# Patient Record
Sex: MALE | Race: OTHER | HISPANIC OR LATINO | URBAN - METROPOLITAN AREA
[De-identification: names, ages, dates, MRNs, and addresses within clinical notes are randomized per-mention and may not be internally consistent; named-entity substitution may affect disease eponyms.]

---

## 2022-03-12 ENCOUNTER — EMERGENCY (EMERGENCY)
Facility: HOSPITAL | Age: 37
LOS: 1 days | Discharge: HOME | End: 2022-03-12
Attending: EMERGENCY MEDICINE
Payer: COMMERCIAL

## 2022-03-12 VITALS
TEMPERATURE: 98 F | SYSTOLIC BLOOD PRESSURE: 129 MMHG | OXYGEN SATURATION: 96 % | RESPIRATION RATE: 16 BRPM | DIASTOLIC BLOOD PRESSURE: 82 MMHG | HEART RATE: 78 BPM | WEIGHT: 202.83 LBS

## 2022-03-12 PROCEDURE — 70450 CT HEAD/BRAIN W/O DYE: CPT | Mod: 26,MG

## 2022-03-12 PROCEDURE — 70450 CT HEAD/BRAIN W/O DYE: CPT | Mod: MG

## 2022-03-12 PROCEDURE — 72100 X-RAY EXAM L-S SPINE 2/3 VWS: CPT

## 2022-03-12 PROCEDURE — 99285 EMERGENCY DEPT VISIT HI MDM: CPT

## 2022-03-12 PROCEDURE — 99284 EMERGENCY DEPT VISIT MOD MDM: CPT | Mod: 25

## 2022-03-12 PROCEDURE — G1004: CPT

## 2022-03-12 PROCEDURE — 72125 CT NECK SPINE W/O DYE: CPT | Mod: 26,MG

## 2022-03-12 PROCEDURE — 72100 X-RAY EXAM L-S SPINE 2/3 VWS: CPT | Mod: 26

## 2022-03-12 PROCEDURE — 72125 CT NECK SPINE W/O DYE: CPT | Mod: MG

## 2022-03-12 PROCEDURE — 71046 X-RAY EXAM CHEST 2 VIEWS: CPT | Mod: 26

## 2022-03-12 PROCEDURE — 71046 X-RAY EXAM CHEST 2 VIEWS: CPT
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
## 2022-03-12 NOTE — ED PROVIDER NOTE - MUSCULOSKELETAL, MLM
Paracervical neck tenderness. No midline tenderness. Spine appears normal, range of motion is not limited, no muscle or joint tenderness

## 2022-03-12 NOTE — ED PROVIDER NOTE - CLINICAL SUMMARY MEDICAL DECISION MAKING FREE TEXT BOX
Patient with headache, neck pain, back pain, and chest pain. Will get CT, x-ray, and reassess. Patient declined analgesia.

## 2022-03-12 NOTE — ED PROVIDER NOTE - PATIENT PORTAL LINK FT
You can access the FollowMyHealth Patient Portal offered by [facility] by registering at the following website: [email or web address]. By joining [facility]’s FollowMyHealth portal, you will also be able to view your health information using other applications (apps) compatible with our system.

## 2022-03-13 VITALS
TEMPERATURE: 98 F | RESPIRATION RATE: 18 BRPM | DIASTOLIC BLOOD PRESSURE: 74 MMHG | SYSTOLIC BLOOD PRESSURE: 121 MMHG | OXYGEN SATURATION: 98 % | HEART RATE: 74 BPM

## 2024-08-09 NOTE — ED ADULT NURSE NOTE - CHPI ED NUR SYMPTOMS POS
Elevate your legs to help with swelling. Start wearing compression socks while at work to help with the leg swelling. Follow-up with PCP.   
BACK PAIN

## 2025-04-25 ENCOUNTER — EMERGENCY (EMERGENCY)
Facility: HOSPITAL | Age: 40
LOS: 1 days | End: 2025-04-25
Attending: EMERGENCY MEDICINE
Payer: SELF-PAY

## 2025-04-25 VITALS
HEART RATE: 81 BPM | SYSTOLIC BLOOD PRESSURE: 121 MMHG | HEIGHT: 67 IN | WEIGHT: 205.03 LBS | TEMPERATURE: 98 F | RESPIRATION RATE: 18 BRPM | OXYGEN SATURATION: 97 % | DIASTOLIC BLOOD PRESSURE: 77 MMHG

## 2025-04-25 PROBLEM — Z78.9 OTHER SPECIFIED HEALTH STATUS: Chronic | Status: ACTIVE | Noted: 2022-03-12

## 2025-04-25 PROCEDURE — 87081 CULTURE SCREEN ONLY: CPT

## 2025-04-25 PROCEDURE — 99284 EMERGENCY DEPT VISIT MOD MDM: CPT

## 2025-04-25 PROCEDURE — 99283 EMERGENCY DEPT VISIT LOW MDM: CPT

## 2025-04-25 RX ORDER — SULFACETAMIDE SODIUM 10 %
1 DROPS OPHTHALMIC (EYE)
Qty: 10 | Refills: 0
Start: 2025-04-25 | End: 2025-04-29

## 2025-04-25 RX ORDER — DEXAMETHASONE 0.5 MG/1
10 TABLET ORAL ONCE
Refills: 0 | Status: COMPLETED | OUTPATIENT
Start: 2025-04-25 | End: 2025-04-25

## 2025-04-25 RX ADMIN — DEXAMETHASONE 10 MILLIGRAM(S): 0.5 TABLET ORAL at 13:56

## 2025-04-25 NOTE — ED PROVIDER NOTE - CLINICAL SUMMARY MEDICAL DECISION MAKING FREE TEXT BOX
39-year-old male, presents with viral-like symptoms for 4 days.  Well-appearing, vital signs stable, afebrile.    No signs of meningeal irritation. History exam consistent with viral syndrome.   Conjunctivitis likely viral.  Explained to patient that if no improving he should use the prescribed antibiotic drops.  Will give 1 dose of Decadron to help with sore throat.  Laryngitis again likely viral.  No signs of pneumonia.  Will swab for strep throat.  At this time no antibiotics unless positive throat swab result.

## 2025-04-25 NOTE — ED ADULT NURSE NOTE - OBJECTIVE STATEMENT
Patient presented to ED with sore throat, productive cough x 4 days. Pt noted yellow drainage to B/L eyes that crusted, starting yesterday evening as per Pt. Pt denies any fevers.

## 2025-04-25 NOTE — ED PROVIDER NOTE - OBJECTIVE STATEMENT
39-year-old male, no significant past medical history, presents for evaluation of multiple medical plaints.  4 days ago started having nonproductive cough, sore throat, losing voice.  Cough is now improving however yesterday noticed some bilateral eye yellowish crusting in the evening and at night.  Denies any photophobia, eyeball pain, difficulty or painful eye movement.  No difficulty swallowing, sensation of throat tightness or drooling. Denies any fever, chills or night sweats.  Denies any difficulty breathing or chest pain.

## 2025-04-25 NOTE — ED PROVIDER NOTE - NSFOLLOWUPINSTRUCTIONS_ED_ALL_ED_FT
Follow-up with your primary care doctor in 3-5 days.  The throat swab result will come back in approximately 24 hours.  If it is abnormal you will be contacted via phone.  Hold off on the antibiotic drops for the eyes for 24 hours.  The most likely cause for the crusting in your eyes is a virus.  If you feel like the discharge is getting worse you can start applying the drops.  Stay will be.  Drink plenty of water.  For pain you can take over the counter Ibuprofen 600 mg orally every 6 hours as needed for pain. Take medication with food.     If you experience any new or worsening symptoms or if you are concerned you can always come back to the emergency for a re-evaluation.  Some results may not be available at the time of your discharge from the hospital. You can download the FOLLOW MY HEALTH [facility] and have access to these results.  If there were any prescriptions given to you during the visit today take them as prescribed. If you have any questions you can ask the pharmacist.

## 2025-04-25 NOTE — ED ADULT NURSE NOTE - NSFALLUNIVINTERV_ED_ALL_ED
Bed/Stretcher in lowest position, wheels locked, appropriate side rails in place/Call bell, personal items and telephone in reach/Instruct patient to call for assistance before getting out of bed/chair/stretcher/Non-slip footwear applied when patient is off stretcher/[place] to call system/Physically safe environment - no spills, clutter or unnecessary equipment/Purposeful proactive rounding/Room/bathroom lighting operational, light cord in reach

## 2025-04-25 NOTE — ED ADULT TRIAGE NOTE - TEMP AT ED ARRIVAL (C)
Approved baclofen 5 mg BID prn pablo, #60. No refills given, last saw Dr. Cheung May 12 2020. Comment added recommending an office visit with Dr. Cheung or [facility].     36.8

## 2025-04-25 NOTE — ED PROVIDER NOTE - EYES, MLM
Clear bilaterally, pupils equal, round and reactive to light.   Minimal peripheral erythema of the conjunctiva bilaterally.  No crusting or discharge.

## 2025-04-25 NOTE — ED ADULT NURSE NOTE - AVIAN FLU CONSUME
No alert and oriented x 3/responds to pain/responds to verbal commands/sensation intact/deep reflexes intact/cranial nerves intact

## 2025-04-25 NOTE — ED PROVIDER NOTE - PATIENT PORTAL LINK FT
You can access the FollowMyHealth Patient Portal offered by [facility] by registering at the following website: [email or web address]. By joining [facility]’s FollowMyHealth portal, you will also be able to view your health information using other applications (apps) compatible with our system.

## 2025-04-25 NOTE — ED ADULT TRIAGE NOTE - CHIEF COMPLAINT QUOTE
C/o sore throat, productive cough x 4 days. Pt noted yellow drainage to B/L eyes that crusted, starting yesterday evening as per Pt. Pt denies any fevers.

## 2025-04-27 LAB
CULTURE RESULTS: SIGNIFICANT CHANGE UP
SPECIMEN SOURCE: SIGNIFICANT CHANGE UP